# Patient Record
Sex: MALE | Race: WHITE | Employment: OTHER | URBAN - METROPOLITAN AREA
[De-identification: names, ages, dates, MRNs, and addresses within clinical notes are randomized per-mention and may not be internally consistent; named-entity substitution may affect disease eponyms.]

---

## 2017-12-09 ENCOUNTER — HOSPITAL ENCOUNTER (EMERGENCY)
Age: 77
Discharge: HOME | End: 2017-12-09
Attending: EMERGENCY MEDICINE
Payer: MEDICARE

## 2017-12-09 ENCOUNTER — APPOINTMENT (OUTPATIENT)
Dept: GENERAL RADIOLOGY | Age: 77
End: 2017-12-09
Attending: EMERGENCY MEDICINE
Payer: MEDICARE

## 2017-12-09 VITALS
DIASTOLIC BLOOD PRESSURE: 89 MMHG | TEMPERATURE: 97.9 F | OXYGEN SATURATION: 98 % | HEIGHT: 67 IN | WEIGHT: 145 LBS | SYSTOLIC BLOOD PRESSURE: 171 MMHG | HEART RATE: 88 BPM | BODY MASS INDEX: 22.76 KG/M2 | RESPIRATION RATE: 18 BRPM

## 2017-12-09 DIAGNOSIS — S61.012A THUMB LACERATION, LEFT, INITIAL ENCOUNTER: Primary | ICD-10-CM

## 2017-12-09 PROCEDURE — 96374 THER/PROPH/DIAG INJ IV PUSH: CPT

## 2017-12-09 PROCEDURE — 74011250636 HC RX REV CODE- 250/636: Performed by: EMERGENCY MEDICINE

## 2017-12-09 PROCEDURE — 73140 X-RAY EXAM OF FINGER(S): CPT

## 2017-12-09 PROCEDURE — 74011000250 HC RX REV CODE- 250: Performed by: EMERGENCY MEDICINE

## 2017-12-09 PROCEDURE — 77030018836 HC SOL IRR NACL ICUM -A

## 2017-12-09 PROCEDURE — 99284 EMERGENCY DEPT VISIT MOD MDM: CPT

## 2017-12-09 PROCEDURE — 77030032241

## 2017-12-09 PROCEDURE — 74011250637 HC RX REV CODE- 250/637: Performed by: PHYSICIAN ASSISTANT

## 2017-12-09 PROCEDURE — 75810000293 HC SIMP/SUPERF WND  RPR

## 2017-12-09 PROCEDURE — 74011000250 HC RX REV CODE- 250: Performed by: PHYSICIAN ASSISTANT

## 2017-12-09 PROCEDURE — 77030031132 HC SUT NYL COVD -A

## 2017-12-09 RX ORDER — CEPHALEXIN 500 MG/1
500 CAPSULE ORAL 4 TIMES DAILY
Qty: 40 CAP | Refills: 0 | Status: SHIPPED | OUTPATIENT
Start: 2017-12-09 | End: 2017-12-19

## 2017-12-09 RX ORDER — CEFAZOLIN SODIUM 1 G/3ML
1 INJECTION, POWDER, FOR SOLUTION INTRAMUSCULAR; INTRAVENOUS
Status: DISCONTINUED | OUTPATIENT
Start: 2017-12-09 | End: 2017-12-09

## 2017-12-09 RX ORDER — BACITRACIN 500 UNIT/G
2 PACKET (EA) TOPICAL ONCE
Status: COMPLETED | OUTPATIENT
Start: 2017-12-09 | End: 2017-12-09

## 2017-12-09 RX ORDER — HYDROCODONE BITARTRATE AND ACETAMINOPHEN 5; 325 MG/1; MG/1
1 TABLET ORAL
Qty: 12 TAB | Refills: 0 | Status: SHIPPED | OUTPATIENT
Start: 2017-12-09 | End: 2021-01-02

## 2017-12-09 RX ORDER — LIDOCAINE HYDROCHLORIDE 20 MG/ML
10 INJECTION, SOLUTION INFILTRATION; PERINEURAL ONCE
Status: COMPLETED | OUTPATIENT
Start: 2017-12-09 | End: 2017-12-09

## 2017-12-09 RX ORDER — OXYCODONE AND ACETAMINOPHEN 5; 325 MG/1; MG/1
1 TABLET ORAL
Status: COMPLETED | OUTPATIENT
Start: 2017-12-09 | End: 2017-12-09

## 2017-12-09 RX ADMIN — OXYCODONE HYDROCHLORIDE AND ACETAMINOPHEN 1 TABLET: 5; 325 TABLET ORAL at 17:45

## 2017-12-09 RX ADMIN — LIDOCAINE HYDROCHLORIDE 200 MG: 20 INJECTION, SOLUTION INFILTRATION; PERINEURAL at 17:45

## 2017-12-09 RX ADMIN — BACITRACIN 2 PACKET: 500 OINTMENT TOPICAL at 20:25

## 2017-12-09 RX ADMIN — WATER 1 G: 1 INJECTION INTRAMUSCULAR; INTRAVENOUS; SUBCUTANEOUS at 17:56

## 2017-12-10 NOTE — DISCHARGE INSTRUCTIONS
Cuts on the Hand Closed With Stitches: Care Instructions  Your Care Instructions    A cut on your hand can be on your fingers, your thumb, or the front or back of your hand. Sometimes a cut can injure the tendons, blood vessels, or nerves of your hand. The doctor used stitches to close the cut. Using stitches also helps the cut heal and reduces scarring. The doctor may have given you a splint to help prevent you from moving your hand, fingers, or thumb. If the cut went deep and through the skin, the doctor put in two layers of stitches. The deeper layer brings the deep part of the cut together. These stitches will dissolve and don't need to be removed. The stitches in the upper layer are the ones you see on the cut. You will probably have a bandage. You will need to have the stitches removed, usually in 7 to 14 days. The doctor may suggest that you see a hand specialist if the cut is very deep or if you have trouble moving your fingers or have less feeling in your hand. The doctor has checked you carefully, but problems can develop later. If you notice any problems or new symptoms, get medical treatment right away. Follow-up care is a key part of your treatment and safety. Be sure to make and go to all appointments, and call your doctor if you are having problems. It's also a good idea to know your test results and keep a list of the medicines you take. How can you care for yourself at home? · Keep the cut dry for the first 24 to 48 hours. After this, you can shower if your doctor okays it. Pat the cut dry. · Don't soak the cut, such as in a bathtub. Your doctor will tell you when it's safe to get the cut wet. · If your doctor told you how to care for your cut, follow your doctor's instructions. If you did not get instructions, follow this general advice:  ¨ After the first 24 to 48 hours, wash around the cut with clean water 2 times a day.  Don't use hydrogen peroxide or alcohol, which can slow healing. ¨ You may cover the cut with a thin layer of petroleum jelly, such as Vaseline, and a nonstick bandage. ¨ Apply more petroleum jelly and replace the bandage as needed. · Prop up the sore hand on a pillow anytime you sit or lie down during the next 3 days. Try to keep it above the level of your heart. This will help reduce swelling. · Avoid any activity that could cause your cut to reopen. · Do not remove the stitches on your own. Your doctor will tell you when to come back to have the stitches removed. · Be safe with medicines. Take pain medicines exactly as directed. ¨ If the doctor gave you a prescription medicine for pain, take it as prescribed. ¨ If you are not taking a prescription pain medicine, ask your doctor if you can take an over-the-counter medicine. When should you call for help? Call your doctor now or seek immediate medical care if:  ? · You have new pain, or your pain gets worse. ? · The skin near the cut is cold or pale or changes color. ? · You have tingling, weakness, or numbness near the cut.   ? · The cut starts to bleed, and blood soaks through the bandage. Oozing small amounts of blood is normal.   ? · You have trouble moving the area of the hand near the cut.   ? · You have symptoms of infection, such as:  ¨ Increased pain, swelling, warmth, or redness around the cut. ¨ Red streaks leading from the cut. ¨ Pus draining from the cut. ¨ A fever. ? Watch closely for changes in your health, and be sure to contact your doctor if:  ? · You do not get better as expected. Where can you learn more? Go to http://cliff-yolande.info/. Enter T250 in the search box to learn more about \"Cuts on the Hand Closed With Stitches: Care Instructions. \"  Current as of: March 20, 2017  Content Version: 11.4  © 3714-9606 [employer].  Care instructions adapted under license by [employer] (which disclaims liability or warranty for this information). If you have questions about a medical condition or this instruction, always ask your healthcare professional. [place] [address] any warranty or liability for your use of this information.

## 2017-12-10 NOTE — ED PROVIDER NOTES
EMERGENCY DEPARTMENT HISTORY AND PHYSICAL EXAM    8:22 PM      Date: 12/9/2017  Patient Name: Adela Calzada    History of Presenting Illness     Chief Complaint   Patient presents with    Laceration         History Provided By: Patient    Chief Complaint: laceration  Duration:  Minutes  Timing:  Acute  Location: left thumb  Quality: Aching  Severity: Moderate  Modifying Factors: none  Associated Symptoms: denies any other associated signs or symptoms      Additional History (Context): Adela Calzada is a 69 y.o. male with hypertension, hyperlipidemia and stroke who presents with laceration to left thumb which occurred just pta. Pt was cutting wood on a table saw and his hand slipped into the blade. Tetanus is up to date. Denies DM. He is unable to flex his thumb normally. Pressure applied at home, which controlled the bleeding. Pt reports he takes a baby ASA daily. Denies recent fevers, chills, chest pain, abdominal pain, SOB, n/v/d/c. PCP: Ramez Araujo MD    Current Outpatient Prescriptions   Medication Sig Dispense Refill    HYDROcodone-acetaminophen (NORCO) 5-325 mg per tablet Take 1 Tab by mouth every four (4) hours as needed for Pain. Max Daily Amount: 6 Tabs. 12 Tab 0    cephALEXin (KEFLEX) 500 mg capsule Take 1 Cap by mouth four (4) times daily for 10 days. 40 Cap 0    aspirin delayed-release 81 mg tablet Take  by mouth daily.  lisinopril-hydrochlorothiazide (PRINZIDE, ZESTORETIC) 20-25 mg per tablet Take 1 Tab by mouth daily.   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    HX APPENDECTOMY  2013    Perforated    HX COLECTOMY Right 2013    cecectomy with appendectomy    HX HERNIA REPAIR Bilateral 1972    HX HERNIA REPAIR Right 3/1/2016    Right inguinal hernia repair       Family History:  Family History   Problem Relation Age of Onset    Heart Disease Father     Heart Attack Father        Social History:  Social History   Substance Use Topics    Smoking status: Former Smoker     Quit date: 1/29/1973    Smokeless tobacco: Never Used    Alcohol use No       Allergies: Allergies   Allergen Reactions    Other Medication Other (comments)     PPD screening serum    Tuberculin Ppd Rash         Review of Systems       Review of Systems   Constitutional: Negative for chills and fever. HENT: Negative for congestion, rhinorrhea and sore throat. Respiratory: Negative for cough and shortness of breath. Cardiovascular: Negative for chest pain. Gastrointestinal: Negative for abdominal pain, blood in stool, constipation, diarrhea, nausea and vomiting. Genitourinary: Negative for dysuria, frequency and hematuria. Musculoskeletal: Negative for back pain and myalgias. Skin: Positive for wound. Negative for rash. Neurological: Negative for dizziness and headaches. Physical Exam     Visit Vitals    /89 (BP 1 Location: Right arm, BP Patient Position: At rest)    Pulse 88    Temp 97.9 °F (36.6 °C)    Resp 18    Ht 5' 7\" (1.702 m)    Wt 65.8 kg (145 lb)    SpO2 98%    BMI 22.71 kg/m2         Physical Exam   Constitutional: He is oriented to person, place, and time. He appears well-developed and well-nourished. No distress. HENT:   Head: Normocephalic and atraumatic. Eyes: Conjunctivae are normal.   Neck: Normal range of motion. Neck supple. Cardiovascular: Normal rate, regular rhythm and normal heart sounds. Pulmonary/Chest: Effort normal and breath sounds normal. No respiratory distress. He has no wheezes. He has no rales. He exhibits no tenderness. Abdominal: Soft. Bowel sounds are normal. He exhibits no distension. There is no tenderness.  There is no rebound and no guarding. Musculoskeletal: He exhibits tenderness. He exhibits no edema or deformity. 4cm stellate laceration involving flexor surface of left thumb. Complete laceration of distal flexor pollicis longus with resultant lack of flexion at IP joint. Intact distal sensation. Cap refill < 2 sec. Neurological: He is alert and oriented to person, place, and time. Skin: Skin is warm and dry. He is not diaphoretic. Psychiatric: He has a normal mood and affect. Nursing note and vitals reviewed. Diagnostic Study Results     Labs -  No results found for this or any previous visit (from the past 12 hour(s)). Radiologic Studies -   No results found. Medical Decision Making   I am the first provider for this patient. I reviewed the vital signs, available nursing notes, past medical history, past surgical history, family history and social history. Vital Signs-Reviewed the patient's vital signs. Pulse Oximetry Analysis -  98% on room air (Interpretation)    Records Reviewed: Nursing Notes, Old Medical Records, Previous Radiology Studies and Previous Laboratory Studies (Time of Review: 8:22 PM)    ED Course: Progress Notes, Reevaluation, and Consults:  0740 PM:  Discussed case with LIDIA Bautista, ortho surgery. Standard discussion including HPI, ROS, exam findings, and available labs and diagnostic testing. Unfortunately our group does not cover hand. Will need to consult [address]. Annamaria Humphrey PA-C      0800 Pm:  Discussed case with Dr. Grazyna May, orthopedic surgeon at THE [facility].  Standard discussion including HPI, ROS, exam findings, and available labs and diagnostic testing. He recommends loose closure, splinting with thumb spica, and DC home with keflex. His office will call patient to schedule follow-up.   Annamaria Humphrey PA-C      Provider Notes (Medical Decision Making):   Differential Diagnosis:  Laceration, avulsion, abrasion, puncture, skin tear, open fracture, contusion    Plan:  Pt presents in NAD, vitals wnl. Tetanus up to date. Wound soaked in NS/betadine solution for 10 minutes prior to irrigation with hibiclens and finally NS once again. He received 1g Ancef via IV here in here. Wound was loosely closed. Dr. Chau Fitzgerald and Dr. Kimberley Peralta assisted in wound closure and bleeding control. Pt [address] home with pain meds, splint, and abx. Follow-up as outpatient with ortho. At this time, patient is stable and appropriate for discharge home. Patient demonstrates understanding of current diagnoses and is in agreement with the treatment plan. They are advised that while the likelihood of serious underlying condition is low at this point given the evaluation performed today, we cannot fully rule it out. They are advised to immediately return with any new symptoms or worsening of current condition. All questions have been answered. Patient is given educational material regarding their diagnoses, including danger symptoms and when to return to the ED. Wound Repair  Date/Time: 12/9/2017 8:23 PM  Performed by: PAPreparation: skin prepped with Betadine, skin prepped with Shur-Clens and sterile field established  Pre-procedure re-eval: Immediately prior to the procedure, the patient was reevaluated and found suitable for the planned procedure and any planned medications. Location details: left thumb  Wound length:2.6 - 7.5 cm  Anesthesia: local infiltration    Anesthesia:  Local Anesthetic: lidocaine 2% without epinephrine  Anesthetic total: 8 mL  Foreign bodies: no foreign bodies  Irrigation solution: saline  Irrigation method: syringe  Debridement: none  Skin closure: 4-0 nylon  Number of sutures: 4  Technique: simple and interrupted  Approximation: close  Dressing: antibiotic ointment and splint  Patient tolerance: Patient tolerated the procedure well with no immediate complications  My total time at bedside, performing this procedure was 16-30 minutes.   Comments: General surgeon Dr. Shantal Galicia performed hemostasis by tie-off of bleeding arteriole during wound repair. Diagnosis     Clinical Impression:   1. Thumb laceration, left, initial encounter    2. Tendon laceration        Disposition: CT Home    Follow-up Information     Follow up With Details Comments Darío Luther MD Call in 2 days For follow-up regarding need for surgical repair of your thumb laceration [address] EMERGENCY DEPT Go to As needed, If symptoms worsen [address]  [phone]           Patient's Medications   Start Taking    CEPHALEXIN (KEFLEX) 500 MG CAPSULE    Take 1 Cap by mouth four (4) times daily for 10 days. HYDROCODONE-ACETAMINOPHEN (NORCO) 5-325 MG PER TABLET    Take 1 Tab by mouth every four (4) hours as needed for Pain. Max Daily Amount: 6 Tabs. Continue Taking    ASPIRIN DELAYED-RELEASE 81 MG TABLET    Take  by mouth daily. LISINOPRIL-HYDROCHLOROTHIAZIDE (PRINZIDE, ZESTORETIC) 20-25 MG PER TABLET    Take 1 Tab by mouth daily.    These Medications have changed    No medications on file   Stop Taking    No medications on file     _______________________________

## 2020-05-12 ENCOUNTER — APPOINTMENT (OUTPATIENT)
Dept: GENERAL RADIOLOGY | Age: 80
End: 2020-05-12
Attending: EMERGENCY MEDICINE
Payer: MEDICARE

## 2020-05-12 ENCOUNTER — HOSPITAL ENCOUNTER (EMERGENCY)
Age: 80
Discharge: HOME | End: 2020-05-12
Attending: EMERGENCY MEDICINE
Payer: MEDICARE

## 2020-05-12 VITALS
HEART RATE: 88 BPM | SYSTOLIC BLOOD PRESSURE: 153 MMHG | TEMPERATURE: 98.2 F | DIASTOLIC BLOOD PRESSURE: 85 MMHG | BODY MASS INDEX: 25.9 KG/M2 | RESPIRATION RATE: 18 BRPM | WEIGHT: 165 LBS | HEIGHT: 67 IN | OXYGEN SATURATION: 99 %

## 2020-05-12 DIAGNOSIS — S46.001A ROTATOR CUFF INJURY, RIGHT, INITIAL ENCOUNTER: ICD-10-CM

## 2020-05-12 DIAGNOSIS — M19.011 OSTEOARTHRITIS OF RIGHT SHOULDER, UNSPECIFIED OSTEOARTHRITIS TYPE: Primary | ICD-10-CM

## 2020-05-12 PROCEDURE — 99282 EMERGENCY DEPT VISIT SF MDM: CPT

## 2020-05-12 PROCEDURE — 73030 X-RAY EXAM OF SHOULDER: CPT

## 2020-05-12 NOTE — ED PROVIDER NOTES
[address] [facility]  EMERGENCY DEPARTMENT HISTORY AND PHYSICAL EXAM       Date: 2020   Patient Name: Leonides Adams   YOB: 1940  Medical Record Number: [id number]    HISTORY OF PRESENTING ILLNESS:     Leonides Adams is a 67 y.o. male presenting with the noted PMH to the ED c/o right shoulder pain. Patient states that started about a week ago. He states it is worse with certain positions. Feels better with certain positions. He states when he lies on it he can feel it radiate underneath his shoulder. Feels better when he is not lying on it. He denies taking any pain medicines for it. Denies any neck pain or back pain. Denies any chest pain or shortness of breath. Denies abdominal pain. Denies any urine or bowel changes. He states he is always had an occasional dry cough for the last 4 years. That is unchanged. Denies any sore throat or nasal congestion. Rest of 10 systems reviewed and negative. 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    HX APPENDECTOMY  2013    Perforated    HX COLECTOMY Right 2013    cecectomy with appendectomy    HX HERNIA REPAIR Bilateral 1972    HX HERNIA REPAIR Right 3/1/2016    Right inguinal hernia repair        Social History:   Social History     Tobacco Use    Smoking status: Former Smoker     Last attempt to quit: 1973     Years since quittin.3    Smokeless tobacco: Never Used   Substance Use Topics    Alcohol use: No     Alcohol/week: 0.0 standard drinks    Drug use: No        Allergies:    Allergies   Allergen Reactions    Other Medication Other (comments)     PPD screening serum    Tuberculin Ppd Rash        REVIEW OF SYSTEMS:  Review of Systems      PHYSICAL EXAM:  Vitals:    05/12/20 1140   BP: 153/85   Pulse: 88   Resp: 18   Temp: 98.2 °F (36.8 °C)   SpO2: 99%   Weight: 74.8 kg (165 lb)   Height: 5' 7\" (1.702 m)       Physical Exam   Vital signs reviewed. Alert oriented x 3 in NAD. HEENT: normocephalic atraumatic. Eyes are PERRLA EOMI. Conjunctiva normal.    External ears and nose normal.    Neck: normal external exam. No midline neck or back TTP. Lungs are clear to ascultation bilaterally. normal effort  Heart is regular rate and rhythm with no murmurs. Abdomen soft and nontender. No rebound rigidity or guarding. Extremities: Moves all 4 extremities and no distress. Full range of motion. 2+ pulses and BCR in all 4 extremities. TTP over his anterior rotator cuff area. No skin changes. No step-offs or crepitus. No edema. Flexion extension intact. Radial median ulnar nerves intact. Neuro: Normal gait. 5 out of 5 strength in all 4 extremities. No facial droop. Skin examination: intact. no rashes. No petechia or purpura. Medications - No data to display    RESULTS:    Labs -   Labs Reviewed - No data to display    Radiologic Studies -  No results found. 2 views right shoulder shows no fracture or dislocation. Does appear to be osteoarthritis. Read by myself. MEDICAL DECISION MAKING      Rice and osteoarthritis precaution explained. Patient to follow-up with primary doctor as well as orthopedic doctor be rechecked. Patient states he has seen an orthopedic doctor before and was told to would develop arthritis. Results and precautions explained. Patient states understanding and agrees with plan. No evidence of septic joint. No evidence of fracture or dislocation. No evidence of compartment syndrome. No evidence of DVT or PE. Patient has no new complaints, changes, or physical findings. Results were reviewed with the patient.   Pt's questions and concerns were addressed. Care plan was outlined, including follow-up with PCP/specialist and return precautions were discussed. Patient is felt to be stable for discharge at this time. Diagnosis   Clinical Impression:   1. Osteoarthritis of right shoulder, unspecified osteoarthritis type    2. Rotator cuff injury, right, initial encounter           Follow-up Information     Follow up With Specialties Details Why Contact Info    Tyrese Cervantes MD Nephrology Call today  [facility] [phone]      [facility]  Call today  [address] [address]  [phone]          Current Discharge Medication List          Discharged in stable and improved condition. This chart was completed using Dragon, a dictation transcription service. Errors may have resulted from using this device.

## 2020-05-12 NOTE — DISCHARGE INSTRUCTIONS
Thank you so much for visiting us today. Please make sure to call your doctor today to be rechecked in 1-3 days. Bring your results from here with you when you do. Return immediately if any fevers, headaches, chest pain, difficulty breathing, abdominal pain, worsening or changing symptoms, or any other concerns. Patient Education        Shoulder Arthritis: Exercises  Introduction  Here are some examples of exercises for you to try. The exercises may be suggested for a condition or for rehabilitation. Start each exercise slowly. Ease off the exercises if you start to have pain. You will be told when to start these exercises and which ones will work best for you. How to do the exercises  Shoulder flexion (lying down)   1. Lie on your back, holding a wand with both hands. Your palms should face down as you hold the wand. 2. Keeping your elbows straight, slowly raise your arms over your head. Raise them until you feel a stretch in your shoulders, upper back, and chest.  3. Hold for 15 to 30 seconds. 4. Repeat 2 to 4 times. Shoulder rotation (lying down)   1. Lie on your back. Hold a wand with both hands with your elbows bent and palms up. 2. Keep your elbows close to your body, and move the wand across your body toward the sore arm. 3. Hold for 8 to 12 seconds. 4. Repeat 2 to 4 times. Shoulder internal rotation with towel   1. Hold a towel above and behind your head with the arm that is not sore. 2. With your sore arm, reach behind your back and grasp the towel. 3. With the arm above your head, pull the towel upward. Do this until you feel a stretch on the front and outside of your sore shoulder. 4. Hold 15 to 30 seconds. 5. Repeat 2 to 4 times. Shoulder blade squeeze   1. Stand with your arms at your sides, and squeeze your shoulder blades together. Do not raise your shoulders up as you squeeze. 2. Hold 6 seconds. 3. Repeat 8 to 12 times. Resisted rows   1.  Put the band around a solid object at about waist level. (A bedpost will work well.) Each hand should hold an end of the band. 2. With your elbows at your sides and bent to 90 degrees, pull the band back. Your shoulder blades should move toward each other. Return to the starting position. 3. Repeat 8 to 12 times. External rotator strengthening exercise   1. Start by tying a piece of elastic exercise material to a doorknob. You can use surgical tubing or Thera-Band. (You may also hold one end of the band in each hand.)  2. Stand or sit with your shoulder relaxed and your elbow bent 90 degrees. Your upper arm should rest comfortably against your side. Squeeze a rolled towel between your elbow and your body for comfort. This will help keep your arm at your side. 3. Hold one end of the elastic band with the hand of the painful arm. 4. Start with your forearm across your belly. Slowly rotate the forearm out away from your body. Keep your elbow and upper arm tucked against the towel roll or the side of your body until you begin to feel tightness in your shoulder. Slowly move your arm back to where you started. 5. Repeat 8 to 12 times. Internal rotator strengthening exercise   1. Start by tying a piece of elastic exercise material to a doorknob. You can use surgical tubing or Thera-Band. 2. Stand or sit with your shoulder relaxed and your elbow bent 90 degrees. Your upper arm should rest comfortably against your side. Squeeze a rolled towel between your elbow and your body for comfort. This will help keep your arm at your side. 3. Hold one end of the elastic band in the hand of the painful arm. 4. Slowly rotate your forearm toward your body until it touches your belly. Slowly move it back to where you started. 5. Keep your elbow and upper arm firmly tucked against the towel roll or at your side. 6. Repeat 8 to 12 times. Pendulum swing   1. Hold on to a table or the back of a chair with your good arm.  Then bend forward a little and let your sore arm hang straight down. This exercise does not use the arm muscles. Rather, use your legs and your hips to create movement that makes your arm swing freely. 2. Use the movement from your hips and legs to guide the slightly swinging arm back and forth like a pendulum (or elephant trunk). Then guide it in circles that start small (about the size of a dinner plate). Make the circles a bit larger each day, as your pain allows. 3. Do this exercise for 5 minutes, 5 to 7 times each day. 4. As you have less pain, try bending over a little farther to do this exercise. This will increase the amount of movement at your shoulder. Follow-up care is a key part of your treatment and safety. Be sure to make and go to all appointments, and call your doctor if you are having problems. It's also a good idea to know your test results and keep a list of the medicines you take. Where can you learn more? Go to http://cliff-yolande.info/  Enter H562 in the search box to learn more about \"Shoulder Arthritis: Exercises. \"  Current as of: June 26, 2019Content Version: 12.4  © 9669-5389 [employer]. Care instructions adapted under license by [employer] (which disclaims liability or warranty for this information). If you have questions about a medical condition or this instruction, always ask your healthcare professional. [place] [address] any warranty or liability for your use of this information. Patient Education        Osteoarthritis: Care Instructions  Your Care Instructions    Arthritis is a common health problem in which the joints are inflamed. There are several kinds of arthritis. Osteoarthritis is caused by a breakdown of cartilage, the hard, thick tissue that cushions the joints. It causes pain, stiffness, and swelling, often in the spine, fingers, hips, and knees.  Osteoarthritis can happen at any age, but it is most common in older people. Osteoarthritis never goes away completely, but it can be controlled. Medicine and home treatment can reduce the pain and prevent the arthritis from getting worse. Follow-up care is a key part of your treatment and safety. Be sure to make and go to all appointments, and call your doctor if you are having problems. It's also a good idea to know your test results and keep a list of the medicines you take. How can you care for yourself at home? · Take a warm shower or bath in the morning to relieve stiffness. Avoid sitting still afterwards. · If the joint is not swollen, use moist heat, like a warm, damp towel, for 20 to 30 minutes, 2 or 3 times a day. Do not use heat on a swollen joint. · If the joint is swollen, use ice or cold packs for 10 to 20 minutes, once an hour. Cold will help relieve pain and reduce inflammation. Put a thin cloth between the ice and your skin. · To prevent stiffness, gently move the joint through its full range of motion several times a day. · If the joint hurts, avoid activities that put a strain on it for a few days. Take rest breaks throughout the day. · Get regular exercise. Walking, swimming, yoga, biking, true chi, and water aerobics are good exercises that are gentle on the joints. · Reach and stay at a healthy weight. If you need to lose or maintain weight, regular exercise and a healthy diet will help. Extra weight can strain the joints, especially the knees and hips, and make the pain worse. Losing even a few pounds may help. · Take pain medicines exactly as directed. ? If the doctor gave you a prescription medicine for pain, take it as prescribed. ? If you are not taking a prescription pain medicine, ask your doctor if you can take an over-the-counter medicine. When should you call for help?   Call your doctor now or seek immediate medical care if:    · The pain is so bad that you cannot use the joint.     · You have sudden back pain with weakness in your legs or loss of bowel or bladder control.     · Your stools are black and tarlike or have streaks of blood.     · You have severe pain and swelling in more than one joint.    Watch closely for changes in your health, and be sure to contact your doctor if:    · You have side effects from the medicines, like belly pain, ongoing heartburn, or nausea.     · Joint pain continues for more than 6 weeks, and home treatment is not helping. Where can you learn more? Go to http://cliff-yolande.info/  Enter U459 in the search box to learn more about \"Osteoarthritis: Care Instructions. \"  Current as of: December 8, 2019Content Version: 12.4  © 1477-3285 [employer]. Care instructions adapted under license by [employer] (which disclaims liability or warranty for this information). If you have questions about a medical condition or this instruction, always ask your healthcare professional. [address] any warranty or liability for your use of this information.

## 2021-01-02 ENCOUNTER — APPOINTMENT (OUTPATIENT)
Dept: GENERAL RADIOLOGY | Age: 81
End: 2021-01-02
Attending: EMERGENCY MEDICINE
Payer: MEDICARE

## 2021-01-02 ENCOUNTER — HOSPITAL ENCOUNTER (EMERGENCY)
Age: 81
Discharge: ANOTHER HOSPITAL | End: 2021-01-02
Attending: EMERGENCY MEDICINE
Payer: MEDICARE

## 2021-01-02 ENCOUNTER — APPOINTMENT (OUTPATIENT)
Dept: CT IMAGING | Age: 81
End: 2021-01-02
Attending: EMERGENCY MEDICINE
Payer: MEDICARE

## 2021-01-02 VITALS
DIASTOLIC BLOOD PRESSURE: 60 MMHG | RESPIRATION RATE: 16 BRPM | OXYGEN SATURATION: 99 % | WEIGHT: 150 LBS | HEART RATE: 68 BPM | BODY MASS INDEX: 23.54 KG/M2 | TEMPERATURE: 97.6 F | SYSTOLIC BLOOD PRESSURE: 148 MMHG | HEIGHT: 67 IN

## 2021-01-02 DIAGNOSIS — T07.XXXA MULTIPLE STAB WOUNDS: Primary | ICD-10-CM
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
## 2021-01-02 PROCEDURE — 74011250636 HC RX REV CODE- 250/636: Performed by: EMERGENCY MEDICINE

## 2021-01-02 PROCEDURE — 90471 IMMUNIZATION ADMIN: CPT

## 2021-01-02 PROCEDURE — 74011000250 HC RX REV CODE- 250: Performed by: EMERGENCY MEDICINE

## 2021-01-02 PROCEDURE — 90715 TDAP VACCINE 7 YRS/> IM: CPT | Performed by: EMERGENCY MEDICINE

## 2021-01-02 PROCEDURE — 71045 X-RAY EXAM CHEST 1 VIEW: CPT

## 2021-01-02 PROCEDURE — 70486 CT MAXILLOFACIAL W/O DYE: CPT

## 2021-01-02 PROCEDURE — 70450 CT HEAD/BRAIN W/O DYE: CPT

## 2021-01-02 PROCEDURE — 99285 EMERGENCY DEPT VISIT HI MDM: CPT

## 2021-01-02 RX ORDER — LIDOCAINE HYDROCHLORIDE 20 MG/ML
20 INJECTION, SOLUTION INFILTRATION; PERINEURAL ONCE
Status: COMPLETED | OUTPATIENT
Start: 2021-01-02 | End: 2021-01-02

## 2021-01-02 RX ADMIN — TETANUS TOXOID, REDUCED DIPHTHERIA TOXOID AND ACELLULAR PERTUSSIS VACCINE, ADSORBED 0.5 ML: 5; 2.5; 8; 8; 2.5 SUSPENSION INTRAMUSCULAR at 05:11

## 2021-01-02 RX ADMIN — LIDOCAINE HYDROCHLORIDE 400 MG: 20 INJECTION, SOLUTION INFILTRATION; PERINEURAL at 05:15
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
## 2021-01-02 NOTE — ED NOTES
1 cm linear laceration lateral to the right eye. 5 cm linear laceration on the right cheek that appears to go deep into the tissues. 3 cm angulated laceration on the right cheek. There is tenderness to palpation of the right mandible. 1 cm horizontal laceration over the left maxillary area. 1 cm laceration on the right shoulder. Multiple superficial lacerations on chest and arms. Right thumb avulsion of the thumbnail with some bleeding.
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
